# Patient Record
Sex: MALE | Race: WHITE
[De-identification: names, ages, dates, MRNs, and addresses within clinical notes are randomized per-mention and may not be internally consistent; named-entity substitution may affect disease eponyms.]

---

## 2017-04-18 ENCOUNTER — OFFICE VISIT (OUTPATIENT)
Dept: NEUROLOGY | Facility: CLINIC | Age: 82
End: 2017-04-18

## 2017-04-18 DIAGNOSIS — G56.22 LESION OF LEFT ULNAR NERVE: ICD-10-CM

## 2017-04-18 DIAGNOSIS — G95.9 CERVICAL MYELOPATHY (H): ICD-10-CM

## 2017-04-18 DIAGNOSIS — G56.03 BILATERAL CARPAL TUNNEL SYNDROME: Primary | ICD-10-CM

## 2017-04-18 NOTE — LETTER
2017       RE: Collin Marshall  [address] [facility] [address]     Dear Colleague,    Thank you for referring your patient, Collin Marshall, to the [facility] NEUROLOGY OUTREACH at [facility]. Please see a copy of my visit note below.    RE: COLLIN MARSHALL : 1935   MRN: [id number] MADALYN: 2017     REFERRING:   Vikas Recinos MD, [facility] [place]   [address]     RIGHT AND LEFT UPPER EXTREMITY EMG EXAMINATION    RIGHT UPPER EXTREMITY      CONDUCTION   VELOCITIES STIM. LATENCY  MS AMPLITUDE   DISTANCE  CM   NCV NORMAL   Right Median Nerve Above Elbow 9.2  6 mV     Record  / APB       27 AE-W 55 M/sec >50    F-Wave :  Wrist 4.3  6 mV             Right Ulnar Nerve Above Elbow 8.9  7 mV     Record / ADM      35.5 AE-W 55 M/sec >50    F-Wave: Below Elbow 7.2  7 mV          24.5 BE-W 53 M/sec >50    Wrist 2.5  7 mV                 Right Sensory Median Nerve  3.7  20 micro               Right Sensory Ulnar  2.7  15 micro               NEEDLE ELECTRODE EXAMINATION (EMG)   MUSCLE     POS. POT.     FIBS.     FASCIC. MOTOR UNIT ACTION POTENTIALS   Right 1st Dorsal Interosseous 0 0 0 Increased size, decreased number, difficult to activate   Right Pronator Teres 0 0 0 Several increased size, decreased number   Right Extensor Digitorum Communis 0 0 0 Increased size, decreased number, difficult to activate   Right Biceps 0 0 0 Few increased size, decreased recruitment   Right Deltoid 0 0 0 Decreased recruitment          LEFT UPPER EXTREMITY      CONDUCTION   VELOCITIES STIM. LATENCY  MS AMPLITUDE   DISTANCE  CM   NCV NORMAL   Left Median Motor Nerve Above Elbow 8.9  6 mV     Record  / APB       26 AE-W 52 M/sec >50    F-Wave :  Wrist 3.9  6 mV             Left Ulnar Nerve Above Elbow 9.7  9 mV     Record / ADM      38 AE-W 55 M/sec >50    F-Wave: Below Elbow 6.9  9 mV          24 BE-W 59 M/sec >50    Wrist 2.8  10 mV              14 Around Elbow 50               Left Sensory Median Nerve  3.8  18 micro               Left Sensory Ulnar  3.2  7 micro               NEEDLE ELECTRODE EXAMINATION (EMG)   MUSCLE     POS. POT.     FIBS.     FASCIC.     MOTOR UNIT ACTION POTENTIALS   Left 1st Dorsal Interosseous 0 0 0 Several increased size, decreased number   Left Pronator Teres 0 0 0 Several increased size, decreased number   Left Extensor Digitorum Communis 0 0 0 Several increased size, mild decreased number   Left Biceps 0 0 0 Few increased size, decreased recruitment   Left Deltoid 0 0 0 Normal            CLINICAL NOTE:   Bilateral upper extremity testing is requested for evaluation of ongoing hand numbness and weakness.  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  He has also undergone bilateral carpal tunnel releases.  Today's study is compared to one performed on 09/04/2015 prior to his carpal tunnel surgeries.     REPORT:     Right median motor terminal latency is minimally prolonged with a normal amplitude response and normal motor conduction velocity.      Right median sensory terminal latency is very mildly prolonged with a normal amplitude response.    Right ulnar conduction velocity studies are within normal limits.    Left median motor conduction velocity studies are within normal limits.    Left median sensory terminal latency is minimally prolonged with a normal amplitude response.     Left ulnar motor conduction velocity studies are within normal limits.     Left ulnar sensory response is reduced amplitude.     Right and left upper extremity needle electrode examination reveals chronic neurogenic changes in the majority of muscles studied.  The most prominent involvement is of C7, C8 and T1 root innervated muscles.  No active denervation is appreciated.     IMPRESSION:  1.  Very mild changes of right carpal tunnel syndrome.  There has been a significant improvement in this finding compared to the study done on 09/04/2015.  2.  Very mild changes  of left carpal tunnel syndrome, significantly improved compared to the 2015 study.  3.  Mild left ulnar sensory neuropathy.  This has improved compared to the 2015 study where a moderately severe left ulnar neuropathy localized around the elbow was identified.  4.  No findings diagnostic of right ulnar neuropathy.   5.  Multilevel chronic right and left cervical radiculopathies.  The most pronounced findings affect C7, C8 and T1 innervated muscles.  These changes are similar to those noted on the 2015 study.    Raz Portillo MD  [facility]  Department of Neurology    cc:  Donnie Martinez MD  [facility]  [address] [place] [address]    Elmo Zamarripa MD  [facility]  [address] [place] [address]    Vikas Recinos MD  [facility]  [address] [place]   [address]     D: 2017 15:15   T: 2017 11:01   MT: AKA    Name:     RAD ALVARADO   MRN:      [id number]        Account:      [id number]   :      1935           Service Date: 2017   Document: [id number]

## 2017-04-18 NOTE — LETTER
2017      RE: Collin Marshall  [address] [facility] [address]       RE: COLLIN MARSHALL : 1935   MRN: [id number] MADALYN: 2017     REFERRING:   Vikas Recinos MD, [facility] [place]   [address]     RIGHT AND LEFT UPPER EXTREMITY EMG EXAMINATION    RIGHT UPPER EXTREMITY      CONDUCTION   VELOCITIES STIM. LATENCY  MS AMPLITUDE   DISTANCE  CM   NCV NORMAL   Right Median Nerve Above Elbow 9.2  6 mV     Record  / APB       27 AE-W 55 M/sec >50    F-Wave :  Wrist 4.3  6 mV             Right Ulnar Nerve Above Elbow 8.9  7 mV     Record / ADM      35.5 AE-W 55 M/sec >50    F-Wave: Below Elbow 7.2  7 mV          24.5 BE-W 53 M/sec >50    Wrist 2.5  7 mV                 Right Sensory Median Nerve  3.7  20 micro               Right Sensory Ulnar  2.7  15 micro               NEEDLE ELECTRODE EXAMINATION (EMG)   MUSCLE     POS. POT.     FIBS.     FASCIC. MOTOR UNIT ACTION POTENTIALS   Right 1st Dorsal Interosseous 0 0 0 Increased size, decreased number, difficult to activate   Right Pronator Teres 0 0 0 Several increased size, decreased number   Right Extensor Digitorum Communis 0 0 0 Increased size, decreased number, difficult to activate   Right Biceps 0 0 0 Few increased size, decreased recruitment   Right Deltoid 0 0 0 Decreased recruitment          LEFT UPPER EXTREMITY      CONDUCTION   VELOCITIES STIM. LATENCY  MS AMPLITUDE   DISTANCE  CM   NCV NORMAL   Left Median Motor Nerve Above Elbow 8.9  6 mV     Record  / APB       26 AE-W 52 M/sec >50    F-Wave :  Wrist 3.9  6 mV             Left Ulnar Nerve Above Elbow 9.7  9 mV     Record / ADM      38 AE-W 55 M/sec >50    F-Wave: Below Elbow 6.9  9 mV          24 BE-W 59 M/sec >50    Wrist 2.8  10 mV              14 Around Elbow 50              Left Sensory Median Nerve  3.8  18 micro               Left Sensory Ulnar  3.2  7 micro               NEEDLE ELECTRODE EXAMINATION (EMG)   MUSCLE     POS. POT.     FIBS.     FASCIC.     MOTOR UNIT ACTION  POTENTIALS   Left 1st Dorsal Interosseous 0 0 0 Several increased size, decreased number   Left Pronator Teres 0 0 0 Several increased size, decreased number   Left Extensor Digitorum Communis 0 0 0 Several increased size, mild decreased number   Left Biceps 0 0 0 Few increased size, decreased recruitment   Left Deltoid 0 0 0 Normal            CLINICAL NOTE:   Bilateral upper extremity testing is requested for evaluation of ongoing hand numbness and weakness.  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  He has also undergone bilateral carpal tunnel releases.  Today's study is compared to one performed on 09/04/2015 prior to his carpal tunnel surgeries.     REPORT:     Right median motor terminal latency is minimally prolonged with a normal amplitude response and normal motor conduction velocity.      Right median sensory terminal latency is very mildly prolonged with a normal amplitude response.    Right ulnar conduction velocity studies are within normal limits.    Left median motor conduction velocity studies are within normal limits.    Left median sensory terminal latency is minimally prolonged with a normal amplitude response.     Left ulnar motor conduction velocity studies are within normal limits.     Left ulnar sensory response is reduced amplitude.     Right and left upper extremity needle electrode examination reveals chronic neurogenic changes in the majority of muscles studied.  The most prominent involvement is of C7, C8 and T1 root innervated muscles.  No active denervation is appreciated.     IMPRESSION:  1.  Very mild changes of right carpal tunnel syndrome.  There has been a significant improvement in this finding compared to the study done on 09/04/2015.  2.  Very mild changes of left carpal tunnel syndrome, significantly improved compared to the 09/04/2015 study.  3.  Mild left ulnar sensory neuropathy.  This has improved compared to the 09/04/2015 study where a moderately severe  left ulnar neuropathy localized around the elbow was identified.  4.  No findings diagnostic of right ulnar neuropathy.   5.  Multilevel chronic right and left cervical radiculopathies.  The most pronounced findings affect C7, C8 and T1 innervated muscles.  These changes are similar to those noted on the 2015 study.      Raz Portillo MD  [facility]  Department of Neurology    cc:  Donnie Martinez MD  [facility]  [address] [place] [address]    Elmo Zamarripa MD  [facility]  [address] [place]   [address]    Vikas Recinos MD  [facility]  [address] [place]   [address]     D: 2017 15:15   T: 2017 11:01   MT: AKA    Name:     RAD ALVARADO   MRN:      [id number]        Account:      [id number]   :      1935           Service Date: 2017   Document: [id number]

## 2017-04-18 NOTE — LETTER
4/18/2017       RE: Collin Marshall  [address] [facility] [address]     Dear Colleague,    Thank you for referring your patient, Collin Marshall, to the [facility] NEUROLOGY OUTREACH at [facility]. Please see a copy of my visit note below.    No notes on file    Again, thank you for allowing me to participate in the care of your patient.      Sincerely,    Raz Portillo MD

## 2017-04-18 NOTE — MR AVS SNAPSHOT
After Visit Summary   2017    Collin Marshall    MRN: [id number]           Patient Information     Date Of Birth          1935        Visit Information        Provider Department      2017 2:00 PM Raz Portillo MD [facility]        Today's Diagnoses     Bilateral carpal tunnel syndrome    -  1    Lesion of left ulnar nerve        Cervical myelopathy (H)           Follow-ups after your visit        Who to contact     Please call your clinic at [phone] to:    Ask questions about your health    Make or cancel appointments    Discuss your medicines    Learn about your test results    Speak to your doctor   If you have compliments or concerns about an experience at your clinic, or if you wish to file a complaint, please contact [facility] Patient Relations at [phone] or email us at [email or web address]         Additional Information About Your Visit        MyChart Information     [employer] is an electronic gateway that provides easy, online access to your medical records. With [employer], you can request a clinic appointment, read your test results, renew a prescription or communicate with your care team.     To sign up for [employer] visit the website at [email or web address]   You will be asked to enter the access code listed below, as well as some personal information. Please follow the directions to create your username and password.     Your access code is: [email or web address]  Expires: 2017 12:29 PM     Your access code will  in 90 days. If you need help or a new code, please contact your [facility] or call [phone] for assistance.        Care EveryWhere ID     This is your Care EveryWhere ID. This could be used by other organizations to access your [facility] medical records  [id number]         Blood Pressure from Last 3 Encounters:   16 150/83   03/17/15 120/80    Weight  from Last 3 Encounters:   No data found for Wt              We Performed the Following     NCS Motor w or w/o F-Wave, 7 or 8 (15825)     Needle EMG - 2 Exremities (5 or more muscles w/ 3or more nerves or 4 or more spinal levels) ([address])        Primary Care Provider Office Phone # Fax #    Elmo Zamarripa [phone]       [address] [address]        Thank you!     Thank you for choosing [facility] NEUROLOGY OUTREACH  for your care. Our goal is always to provide you with excellent care. Hearing back from our patients is one way we can continue to improve our services. Please take a few minutes to complete the written survey that you may receive in the mail after your visit with us. Thank you!             Your Updated Medication List - Protect others around you: Learn how to safely use, store and throw away your medicines at www.disposemymeds.org.          This list is accurate as of: 4/18/17 11:59 PM.  Always use your most recent med list.                   Brand Name Dispense Instructions for use    CALCIUM + D PO          GABAPENTIN PO      Take 300 mg by mouth 2 times daily       glucosamine-chondroitin 500-400 MG Caps per capsule      Take 1 capsule by mouth daily       hydrochlorothiazide 12.5 MG capsule    MICROZIDE     Take 12.5 mg by mouth daily       SAW PALMETTO EXTRACT PO      Take by mouth daily       TYLENOL PO      Take 1,000 mg by mouth every 8 hours as needed for mild pain or fever       Vitamin C 500 MG Caps      Take 1 capsule by mouth

## 2017-04-19 NOTE — PROGRESS NOTES
RE: RAD ALVARADO : 1935   MRN: [id number] MADALYN: 2017     REFERRING:   Vikas Recinos MD, [place]   [address]     RIGHT AND LEFT UPPER EXTREMITY EMG EXAMINATION    RIGHT UPPER EXTREMITY      CONDUCTION   VELOCITIES STIM. LATENCY  MS AMPLITUDE   DISTANCE  CM   NCV NORMAL   Right Median Nerve Above Elbow 9.2  6 mV     Record  / APB       27 AE-W 55 M/sec >50    F-Wave :  Wrist 4.3  6 mV             Right Ulnar Nerve Above Elbow 8.9  7 mV     Record / ADM      35.5 AE-W 55 M/sec >50    F-Wave: Below Elbow 7.2  7 mV          24.5 BE-W 53 M/sec >50    Wrist 2.5  7 mV                 Right Sensory Median Nerve  3.7  20 micro               Right Sensory Ulnar  2.7  15 micro               NEEDLE ELECTRODE EXAMINATION (EMG)   MUSCLE     POS. POT.     FIBS.     FASCIC. MOTOR UNIT ACTION POTENTIALS   Right 1st Dorsal Interosseous 0 0 0 Increased size, decreased number, difficult to activate   Right Pronator Teres 0 0 0 Several increased size, decreased number   Right Extensor Digitorum Communis 0 0 0 Increased size, decreased number, difficult to activate   Right Biceps 0 0 0 Few increased size, decreased recruitment   Right Deltoid 0 0 0 Decreased recruitment          LEFT UPPER EXTREMITY      CONDUCTION   VELOCITIES STIM. LATENCY  MS AMPLITUDE   DISTANCE  CM   NCV NORMAL   Left Median Motor Nerve Above Elbow 8.9  6 mV     Record  / APB       26 AE-W 52 M/sec >50    F-Wave :  Wrist 3.9  6 mV             Left Ulnar Nerve Above Elbow 9.7  9 mV     Record / ADM      38 AE-W 55 M/sec >50    F-Wave: Below Elbow 6.9  9 mV          24 BE-W 59 M/sec >50    Wrist 2.8  10 mV              14 Around Elbow 50              Left Sensory Median Nerve  3.8  18 micro               Left Sensory Ulnar  3.2  7 micro               NEEDLE ELECTRODE EXAMINATION (EMG)   MUSCLE     POS. POT.     FIBS.     FASCIC.     MOTOR UNIT ACTION POTENTIALS   Left 1st Dorsal Interosseous 0 0 0 Several increased size,  decreased number   Left Pronator Teres 0 0 0 Several increased size, decreased number   Left Extensor Digitorum Communis 0 0 0 Several increased size, mild decreased number   Left Biceps 0 0 0 Few increased size, decreased recruitment   Left Deltoid 0 0 0 Normal            CLINICAL NOTE:   Bilateral upper extremity testing is requested for evaluation of ongoing hand numbness and weakness.  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  He has also undergone bilateral carpal tunnel releases.  Today's study is compared to one performed on 09/04/2015 prior to his carpal tunnel surgeries.     REPORT:     Right median motor terminal latency is minimally prolonged with a normal amplitude response and normal motor conduction velocity.      Right median sensory terminal latency is very mildly prolonged with a normal amplitude response.    Right ulnar conduction velocity studies are within normal limits.    Left median motor conduction velocity studies are within normal limits.    Left median sensory terminal latency is minimally prolonged with a normal amplitude response.     Left ulnar motor conduction velocity studies are within normal limits.     Left ulnar sensory response is reduced amplitude.     Right and left upper extremity needle electrode examination reveals chronic neurogenic changes in the majority of muscles studied.  The most prominent involvement is of C7, C8 and T1 root innervated muscles.  No active denervation is appreciated.     IMPRESSION:  1.  Very mild changes of right carpal tunnel syndrome.  There has been a significant improvement in this finding compared to the study done on 09/04/2015.  2.  Very mild changes of left carpal tunnel syndrome, significantly improved compared to the 09/04/2015 study.  3.  Mild left ulnar sensory neuropathy.  This has improved compared to the 09/04/2015 study where a moderately severe left ulnar neuropathy localized around the elbow was identified.  4.   No findings diagnostic of right ulnar neuropathy.   5.  Multilevel chronic right and left cervical radiculopathies.  The most pronounced findings affect C7, C8 and T1 innervated muscles.  These changes are similar to those noted on the 2015 study.      Raz Portillo MD  [facility]  Department of Neurology    cc:  Donnie Martinez MD  [facility]  [address] [place] [address]    Elmo Zamarripa MD  [facility]  [address] [place] [address]    Vikas Recinos MD  [facility]  [address] [place]   [address]     D: 2017 15:15   T: 2017 11:01   MT: AKA    Name:     RAD ALVARADO   MRN:      [id number]        Account:      [id number]   :      1935           Service Date: 2017   Document: [id number]